# Patient Record
Sex: MALE | Race: OTHER | HISPANIC OR LATINO
[De-identification: names, ages, dates, MRNs, and addresses within clinical notes are randomized per-mention and may not be internally consistent; named-entity substitution may affect disease eponyms.]

---

## 2017-02-10 ENCOUNTER — APPOINTMENT (OUTPATIENT)
Dept: FAMILY MEDICINE | Facility: HOSPITAL | Age: 42
End: 2017-02-10

## 2017-02-10 ENCOUNTER — OUTPATIENT (OUTPATIENT)
Dept: OUTPATIENT SERVICES | Facility: HOSPITAL | Age: 42
LOS: 1 days | End: 2017-02-10
Payer: MEDICAID

## 2017-02-10 VITALS
HEART RATE: 87 BPM | SYSTOLIC BLOOD PRESSURE: 129 MMHG | OXYGEN SATURATION: 100 % | TEMPERATURE: 98.5 F | DIASTOLIC BLOOD PRESSURE: 96 MMHG | BODY MASS INDEX: 28.53 KG/M2 | WEIGHT: 156 LBS | RESPIRATION RATE: 16 BRPM

## 2017-02-10 PROCEDURE — G0463: CPT

## 2017-05-08 ENCOUNTER — OUTPATIENT (OUTPATIENT)
Dept: OUTPATIENT SERVICES | Facility: HOSPITAL | Age: 42
LOS: 1 days | End: 2017-05-08
Payer: COMMERCIAL

## 2017-05-08 ENCOUNTER — APPOINTMENT (OUTPATIENT)
Dept: FAMILY MEDICINE | Facility: HOSPITAL | Age: 42
End: 2017-05-08

## 2017-05-08 VITALS — RESPIRATION RATE: 14 BRPM | OXYGEN SATURATION: 99 % | HEART RATE: 96 BPM

## 2017-05-08 VITALS
WEIGHT: 164 LBS | SYSTOLIC BLOOD PRESSURE: 146 MMHG | TEMPERATURE: 98 F | BODY MASS INDEX: 30 KG/M2 | DIASTOLIC BLOOD PRESSURE: 100 MMHG

## 2017-05-08 PROCEDURE — G0463: CPT

## 2018-01-20 ENCOUNTER — EMERGENCY (EMERGENCY)
Facility: HOSPITAL | Age: 43
LOS: 1 days | Discharge: HOME | End: 2018-01-20
Admitting: EMERGENCY MEDICINE
Payer: COMMERCIAL
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
## 2018-01-20 DIAGNOSIS — M54.5 LOW BACK PAIN: ICD-10-CM
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
## 2018-01-20 DIAGNOSIS — S39.012A STRAIN OF MUSCLE, FASCIA AND TENDON OF LOWER BACK, INITIAL ENCOUNTER: ICD-10-CM
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
## 2018-01-20 PROCEDURE — 72100 X-RAY EXAM L-S SPINE 2/3 VWS: CPT | Mod: 26

## 2018-01-20 PROCEDURE — 99284 EMERGENCY DEPT VISIT MOD MDM: CPT

## 2018-01-21 PROCEDURE — 99283 EMERGENCY DEPT VISIT LOW MDM: CPT | Mod: 25

## 2018-01-21 PROCEDURE — 72100 X-RAY EXAM L-S SPINE 2/3 VWS: CPT

## 2018-01-21 PROCEDURE — 96372 THER/PROPH/DIAG INJ SC/IM: CPT

## 2018-02-06 ENCOUNTER — OUTPATIENT (OUTPATIENT)
Dept: OUTPATIENT SERVICES | Facility: HOSPITAL | Age: 43
LOS: 1 days | End: 2018-02-06
Payer: COMMERCIAL

## 2018-02-06 ENCOUNTER — APPOINTMENT (OUTPATIENT)
Dept: FAMILY MEDICINE | Facility: HOSPITAL | Age: 43
End: 2018-02-06
Payer: COMMERCIAL

## 2018-02-06 VITALS
BODY MASS INDEX: 29.81 KG/M2 | RESPIRATION RATE: 14 BRPM | WEIGHT: 163 LBS | SYSTOLIC BLOOD PRESSURE: 134 MMHG | HEART RATE: 108 BPM | DIASTOLIC BLOOD PRESSURE: 94 MMHG | OXYGEN SATURATION: 96 % | TEMPERATURE: 97.4 F

## 2018-02-06 DIAGNOSIS — Z00.00 ENCOUNTER FOR GENERAL ADULT MEDICAL EXAMINATION WITHOUT ABNORMAL FINDINGS: ICD-10-CM

## 2018-02-06 PROCEDURE — 93005 ELECTROCARDIOGRAM TRACING: CPT

## 2018-02-06 PROCEDURE — G0463: CPT

## 2018-02-06 PROCEDURE — 93010 ELECTROCARDIOGRAM REPORT: CPT

## 2018-02-06 RX ORDER — NYSTATIN 100MM UNIT
POWDER (EA) MISCELLANEOUS
Qty: 1 | Refills: 2 | Status: DISCONTINUED | COMMUNITY
Start: 2017-05-08 | End: 2018-02-06

## 2018-02-06 RX ORDER — CLOTRIMAZOLE 10 MG/G
1 CREAM TOPICAL
Qty: 1 | Refills: 1 | Status: DISCONTINUED | COMMUNITY
Start: 2017-05-08 | End: 2018-02-06

## 2018-02-08 DIAGNOSIS — R51 HEADACHE: ICD-10-CM

## 2018-02-08 DIAGNOSIS — Z59.0 HOMELESSNESS: ICD-10-CM

## 2018-02-08 DIAGNOSIS — E66.3 OVERWEIGHT: ICD-10-CM

## 2018-02-08 DIAGNOSIS — I10 ESSENTIAL (PRIMARY) HYPERTENSION: ICD-10-CM

## 2018-02-08 SDOH — ECONOMIC STABILITY - HOUSING INSECURITY: HOMELESSNESS: Z59.0

## 2018-09-22 ENCOUNTER — OUTPATIENT (OUTPATIENT)
Dept: OUTPATIENT SERVICES | Facility: HOSPITAL | Age: 43
LOS: 1 days | End: 2018-09-22
Payer: COMMERCIAL

## 2018-09-22 ENCOUNTER — APPOINTMENT (OUTPATIENT)
Dept: FAMILY MEDICINE | Facility: HOSPITAL | Age: 43
End: 2018-09-22

## 2018-09-22 VITALS
OXYGEN SATURATION: 98 % | HEIGHT: 62 IN | DIASTOLIC BLOOD PRESSURE: 94 MMHG | BODY MASS INDEX: 27.6 KG/M2 | RESPIRATION RATE: 14 BRPM | WEIGHT: 150 LBS | SYSTOLIC BLOOD PRESSURE: 135 MMHG | TEMPERATURE: 97.5 F | HEART RATE: 85 BPM

## 2018-09-22 DIAGNOSIS — Z00.00 ENCOUNTER FOR GENERAL ADULT MEDICAL EXAMINATION WITHOUT ABNORMAL FINDINGS: ICD-10-CM

## 2018-09-22 PROCEDURE — 80053 COMPREHEN METABOLIC PANEL: CPT

## 2018-09-22 PROCEDURE — 87389 HIV-1 AG W/HIV-1&-2 AB AG IA: CPT

## 2018-09-22 PROCEDURE — G0463: CPT

## 2018-09-22 PROCEDURE — 83036 HEMOGLOBIN GLYCOSYLATED A1C: CPT

## 2018-09-22 PROCEDURE — 80061 LIPID PANEL: CPT

## 2018-09-22 NOTE — COUNSELING
[Weight management counseling provided] : Weight management [Activity counseling provided] : activity [Keep Food Diary] : Keep food diary [Low Salt Diet] : Low salt diet

## 2018-09-25 LAB
ALBUMIN SERPL ELPH-MCNC: 4.4 G/DL
ALP BLD-CCNC: 59 U/L
ALT SERPL-CCNC: 17 U/L
ANION GAP SERPL CALC-SCNC: 15 MMOL/L
AST SERPL-CCNC: 19 U/L
BASOPHILS # BLD AUTO: 0.02 K/UL
BASOPHILS NFR BLD AUTO: 0.4 %
BILIRUB SERPL-MCNC: 0.3 MG/DL
BUN SERPL-MCNC: 12 MG/DL
CALCIUM SERPL-MCNC: 9.2 MG/DL
CHLORIDE SERPL-SCNC: 103 MMOL/L
CHOLEST SERPL-MCNC: 140 MG/DL
CHOLEST/HDLC SERPL: 3.7 RATIO
CO2 SERPL-SCNC: 23 MMOL/L
CREAT SERPL-MCNC: 1.07 MG/DL
EOSINOPHIL # BLD AUTO: 0.07 K/UL
EOSINOPHIL NFR BLD AUTO: 1.3 %
ESTIMATED AVERAGE GLUCOSE: 103 MG/DL
GLUCOSE SERPL-MCNC: 90 MG/DL
HBA1C MFR BLD HPLC: 5.2 %
HCT VFR BLD CALC: 42.9 %
HDLC SERPL-MCNC: 38 MG/DL
HGB BLD-MCNC: 14.8 G/DL
HIV1+2 AB SPEC QL IA.RAPID: NONREACTIVE
IMM GRANULOCYTES NFR BLD AUTO: 0.4 %
LDLC SERPL CALC-MCNC: 89 MG/DL
LYMPHOCYTES # BLD AUTO: 0.96 K/UL
LYMPHOCYTES NFR BLD AUTO: 17.8 %
MAN DIFF?: NORMAL
MCHC RBC-ENTMCNC: 29.7 PG
MCHC RBC-ENTMCNC: 34.5 GM/DL
MCV RBC AUTO: 86 FL
MONOCYTES # BLD AUTO: 0.48 K/UL
MONOCYTES NFR BLD AUTO: 8.9 %
NEUTROPHILS # BLD AUTO: 3.85 K/UL
NEUTROPHILS NFR BLD AUTO: 71.2 %
PLATELET # BLD AUTO: 249 K/UL
POTASSIUM SERPL-SCNC: 4.6 MMOL/L
PROT SERPL-MCNC: 7.1 G/DL
RBC # BLD: 4.99 M/UL
RBC # FLD: 14.3 %
SODIUM SERPL-SCNC: 141 MMOL/L
TRIGL SERPL-MCNC: 66 MG/DL
WBC # FLD AUTO: 5.4 K/UL

## 2018-09-25 NOTE — ASSESSMENT
[FreeTextEntry1] : 43 year old here for CPE\par \par #CPE\par \par -flu shot given today\par -f/u cbc, cmp, A1C, lipid panel and HIV\par -f/u prn for acute issues or in a year for CPE\par \par

## 2018-09-25 NOTE — HISTORY OF PRESENT ILLNESS
[FreeTextEntry1] : CPE [de-identified] : 43 year old Male pt here today for CPE. No complaints. States he has not had blood work done in a while and would like labs done. Denies an ROS questions

## 2021-10-19 ENCOUNTER — EMERGENCY (EMERGENCY)
Facility: HOSPITAL | Age: 46
LOS: 1 days | Discharge: HOME | End: 2021-10-19
Attending: EMERGENCY MEDICINE | Admitting: EMERGENCY MEDICINE
Payer: SELF-PAY

## 2021-10-19 VITALS
OXYGEN SATURATION: 100 % | TEMPERATURE: 97 F | HEIGHT: 60 IN | HEART RATE: 120 BPM | WEIGHT: 160.06 LBS | RESPIRATION RATE: 16 BRPM | SYSTOLIC BLOOD PRESSURE: 149 MMHG | DIASTOLIC BLOOD PRESSURE: 96 MMHG

## 2021-10-19 DIAGNOSIS — S93.402A SPRAIN OF UNSPECIFIED LIGAMENT OF LEFT ANKLE, INITIAL ENCOUNTER: ICD-10-CM

## 2021-10-19 DIAGNOSIS — S80.212A ABRASION, LEFT KNEE, INITIAL ENCOUNTER: ICD-10-CM
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
## 2021-10-19 DIAGNOSIS — M25.572 PAIN IN LEFT ANKLE AND JOINTS OF LEFT FOOT: ICD-10-CM
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
## 2021-10-19 PROCEDURE — 73562 X-RAY EXAM OF KNEE 3: CPT

## 2021-10-19 PROCEDURE — 73630 X-RAY EXAM OF FOOT: CPT | Mod: 26,LT

## 2021-10-19 PROCEDURE — 73630 X-RAY EXAM OF FOOT: CPT

## 2021-10-19 PROCEDURE — 73610 X-RAY EXAM OF ANKLE: CPT | Mod: 26,LT

## 2021-10-19 PROCEDURE — 73610 X-RAY EXAM OF ANKLE: CPT

## 2021-10-19 PROCEDURE — 99284 EMERGENCY DEPT VISIT MOD MDM: CPT | Mod: 25

## 2021-10-19 PROCEDURE — 99284 EMERGENCY DEPT VISIT MOD MDM: CPT

## 2021-10-19 PROCEDURE — 73562 X-RAY EXAM OF KNEE 3: CPT | Mod: 26,LT

## 2021-10-19 RX ORDER — IBUPROFEN 200 MG
600 TABLET ORAL ONCE
Refills: 0 | Status: COMPLETED | OUTPATIENT
Start: 2021-10-19 | End: 2021-10-19

## 2021-10-19 RX ADMIN — Medication 600 MILLIGRAM(S): at 11:31

## 2021-10-19 RX ADMIN — Medication 600 MILLIGRAM(S): at 11:25

## 2021-10-19 NOTE — ED PROVIDER NOTE - PHYSICAL EXAMINATION
left ankle- medial tenderness, positive ROM, NVI, positive 2+ pedal pulse, less than 2 sec cap refill   spine- no bony tenderness, positive ROM

## 2021-10-19 NOTE — ED PROVIDER NOTE - CARE PROVIDERS DIRECT ADDRESSES
,[email or web address]

## 2021-10-19 NOTE — ED PROVIDER NOTE - NSFOLLOWUPINSTRUCTIONS_ED_ALL_ED_FT
Rest, ice, elevate   Take motrin 600mg every 6 hours as needed for pain   Return to the ED if any worsening or persistent symptoms.         Esguince de tobillo    LO QUE NECESITA SABER:    ¿Qué es lucas torcedura de tobillo?Un esguince o torcedura de tobillo ocurre cuando 1 o más ligamentos en la articulación del tobillo se estiran o desgarran. Los ligamentos son tejidos resistentes que conectan los huesos. Los ligamentos sostienen las articulaciones y mantienen los huesos en gallardo lugar.    ¿Cuáles son los signos y síntomas de lucas torcedura de tobillo?  •Dificultad para  el tobillo o el pie      •Dolor al tocar o apoyar gallardo tobillo      •Moretones, inflamación o deformación del tobillo      ¿Cómo se diagnostica lucas torcedura de tobillo?Gallardo médico le preguntará acerca de gallardo lesión y lo examinará. Infórmele si usted escuchó un crujido o estallido cuando se lesionó. Gallardo médico revisará el movimiento y la fuerza de gallardo articulación. Es posible que le pida que usted mismo mueva la articulación. Informe a gallardo médico si alguna vez ha tenido lucas reacción alérgica al medio de contraste. Es posible que usted necesite alguno de los siguientes:   •Lucas radiografía de la articulaciónes lucas imagen de los huesos y tejidos en arabella articulaciones. Es posible que se le administre líquido de contraste en forma de inyección en gallardo articulación antes de caryn la radiografía. El medio de contraste ayudará a que la articulación se pueda apreciar con más claridad en la radiografía. Lucas radiografía de la articulación con medio de contraste se le llama artografía.      •Lucas resonancia magnética (RM)puede mostrar el esguince. Es posible que le administren un líquido de contraste para que las imágenes se aprecien mejor. No entre a la joey donde se realiza la resonancia magnética con algo de metal. El metal puede causar lesiones serias. Informe a gallardo médico si tiene algún metal dentro o sobre gallardo cuerpo.      ¿Cómo se trata lucas torcedura de tobillo?  •Los dispositivos de soportecomo lucas tobillera, un yeso o lucas férula podrían necesitarse para inmovilizar y proteger la articulación. Es posible que necesite usar muletas para disminuir el dolor cuando se desplace de un lado a otro.      •Medicamentos:?Los KENDELL,sweta el ibuprofeno, ayudan a disminuir la inflamación, el dolor y la fiebre. Ayde medicamento está disponible con o sin lucas receta médica. Los KENDELL pueden causar sangrado estomacal o problemas renales en ciertas personas. Si usted blayne un medicamento anticoagulante, siempre pregúntele a gallardo médico si los KENDELL son seguros para usted. Siempre raya la etiqueta de ayde medicamento y siga las instrucciones.      ?Acetaminofénalivia el dolor y baja la fiebre. Está disponible sin receta médica. Pregunte la cantidad y la frecuencia con que debe tomarlos. Siga las indicaciones. Raya las etiquetas de todos los demás medicamentos que esté usando para saber si también contienen acetaminofén, o pregunte a gallardo médico o farmacéutico. El acetaminofén puede causar daño en el hígado cuando no se blayne de forma correcta. No use más de 4 gramos (4000 miligramos) en total de acetaminofeno en un día.      ?Puede administrarsepodrían administrarse. Pregunte al médico cómo debe caryn ayde medicamento de forma ambriz. Algunos medicamentos recetados para el dolor contienen acetaminofén. No tome otros medicamentos que contengan acetaminofén sin consultarlo con gallardo médico. Demasiado acetaminofeno puede causar daño al hígado. Los medicamentos recetados para el dolor podrían causar estreñimiento. Pregunte a gallardo médico sweta prevenir o tratar estreñimiento.      •La fisioterapiapodría recomendarse. Un fisioterapeuta le puede enseñar ejercicios para ayudarle a mejorar el movimiento y la fuerza, y para disminuir el dolor.      •La cirugíapodría ser necesaria para reparar o reemplazar un ligamento desgarrado si la torcedura no se recupera con otros tratamientos. Gallardo médico podría usar tornillos para sujetar los huesos en el tobillo. Es posible que los tornillos puedan ayudar a apoyarlo y a estabilizarlo. Consulte con gallardo médico para más información sobre cirugía para tratar gallardo torcedura de tobillo.      ¿Cómo puedo manejar la torcedura de tobillo?  •Descansesu tobillo para que pueda sanar. Regrese a arabella actividades cotidianas según las indicaciones.      •Aplique hielosobre el tobillo de 15 a 20 minutos cada hora o sweta se le indique. Use lucas compresa de hielo o ponga hielo triturado en lucas bolsa de plástico. Cúbrala con lucas toalla. El hielo ayuda a evitar daño al tejido y a disminuir la inflamación y el dolor.      •Compresiónsu tobillo. Pregunte si debería envolver un vendaje elástico alrededor del ligamento lesionado. La venda elástica le proporciona soporte y ayuda a disminuir la inflamación y el movimiento para que la articulación se recupere. Úselo por el tiempo indicado.  Cómo colocar lucas venda elástica           •Elevesu tobillo por encima del nivel de gallardo corazón con la mayor frecuencia posible. [place] va a disminuir inflamación y el dolor. Apoye el tobillo sobre almohadas o cobijas para mantenerlo elevado cómodamente.   Elevar la pierna           ¿Cómo puedo evitar otra lesión?  •Permita que el tobillo se recupere.Infórmese del tiempo que el ligamento necesita para sanar. No mirna ninguna actividad física hasta que el médico lo autorice. Si usted empieza alguna actividad muy pronto, podría desarrollar lucas lesión más grave.      •Siempre mirna ejercicios de calentamiento y estiramientoantes de hacer ejercicio o practicar un deporte.      •Use equipo adecuado.Siempre use el calzado que le quede carley y esté hecho para la actividad que está practicando. También podría necesitar apoyo para los tobillos, almohadillas para los codos y rodillas o abrazaderas.      ¿Cuándo randal buscar atención inmediata?  •Usted tiene dolor intenso en el tobillo.      •Gallardo pie o dedos del pie están fríos o entumecidos.      •Gallardo tobillo se pone más débil o inestable (tambaleante).      •Usted no puede apoyarse sobre el tobillo o pie.      •Gallardo inflamación ha aumentado o regresado.      ¿Cuándo randal llamar a mi médico?  •El dolor no desaparece, incluso después del tratamiento.      •Usted tiene preguntas o inquietudes acerca de gallardo condición o cuidado.      ACUERDOS SOBRE GALLARDO CUIDADO:    Usted tiene el derecho de ayudar a planear gallardo cuidado. Aprenda todo lo que pueda sobre gallardo condición y sweta darle tratamiento. Discuta arabella opciones de tratamiento con arabella médicos para decidir el cuidado que usted desea recibir. Usted siempre tiene el derecho de rechazar el tratamiento.               Abrasión    LO QUE NECESITA SABER:    ¿Qué es lucas abrasión?Lucas abrasión es lucas herida en la piel. Las abrasiones suelen ocurrir cuando la piel roza contra lucas superficie áspera. Ejemplos de lucas abrasión incluyen lucas quemadura con la alfombra, un codo despellejado o lucas rozadura producida por lucas caída. Las abrasiones pueden ser profundas o superficiales. La herida puede doler, sangrar, presentar hematomas o hincharse.    ¿Cómo puedo cuidar mi abrasión?  •Lávese las rolando y séquelas con lucas toalla limpia arlen.      •Presione un paño limpio contra la herida por 5 a 10 minutos para detener el sangrado.      •Enjuague la herida con agua limpia. No use un jabón muy mary, alcohol o soluciones de yodo.      •Use un paño limpio y mojado para quitar cualquier objeto, sweta pedazos pequeños de piedras o polvo.      •Frote ungüento de antibiótico en gallardo herida. [place] podría ayudar a evitar lucas infección y ayudar a que gallardo herida sane.      •Cubra la herida con un vendaje antiadherente. Cambie el vendaje diariamente, y también si se moja o se ensucia.      ¿Cuándo randal buscar atención inmediata?  •El sangrado no se detiene después de 10 minutos de presionar firmemente.      •El enrojecimiento alrededor de la herida comienza a extenderse.      •Usted no puede enjuagar alyx o más objetos extraños fuera de gallardo herida.      ¿Cuándo randal llamar a mi médico?  •Usted tiene fiebre o escalofríos.      •Gallardo abrasión está enrojecida, cálida, inflamada o drenando pus.      •Usted tiene preguntas o inquietudes acerca de gallardo condición o cuidado.      ACUERDOS SOBRE GALLARDO CUIDADO:    Usted tiene el derecho de ayudar a planear gallardo cuidado. Aprenda todo lo que pueda sobre gallardo condición y sweta darle tratamiento. Discuta arabella opciones de tratamiento con arabella médicos para decidir el cuidado que usted desea recibir. Usted siempre tiene el derecho de rechazar el tratamiento.

## 2021-10-19 NOTE — ED PROVIDER NOTE - CARE PLAN
Principal Discharge DX:	Left ankle sprain  Secondary Diagnosis:	Fall  Secondary Diagnosis:	Abrasion of knee, left   1

## 2021-10-19 NOTE — ED PROVIDER NOTE - CARE PROVIDER_API CALL
Mauricio Dalal)  Orthopaedic Sports Medicine; Orthopaedic Surgery  [address] [address]  Phone: [phone]  Fax: [phone]  Follow Up Time:

## 2021-10-19 NOTE — ED PROCEDURE NOTE - NS ED PERI NEURO NEG
Pre-application: Motor, sensory, and vascular responses intact in the injured extremity.
Pre-application: Motor, sensory, and vascular responses intact in the injured extremity./Post-application: Motor, sensory, and vascular responses intact in the injured extremity./The patient/caregiver verbalized understanding of how to care for the injured extremity with splint

## 2021-10-19 NOTE — ED ADULT NURSE NOTE - OBJECTIVE STATEMENT
46 yr old male c/o left ankle pain. Pt states he tripped and fell yesterday. Pt denies LOC, + swelling to left ankle, + sensation, capillary refill < 3 sec , skin warm/dry.

## 2021-10-19 NOTE — ED PROVIDER NOTE - PATIENT PORTAL LINK FT
You can access the FollowMyHealth Patient Portal offered by [facility] by registering at the following website: [email or web address]. By joining [employer]’s FollowMyHealth portal, you will also be able to view your health information using other applications (apps) compatible with our system.

## 2021-10-19 NOTE — ED PROCEDURE NOTE - ATTENDING CONTRIBUTION TO CARE
Reports irregular contractions.   
Reports irregular contractions.   Reports mood stable.   
Dr. Amin: I performed a face to face bedside interview with patient regarding history of present illness, review of symptoms and past medical history. I completed an independent physical exam.  I have discussed patient's plan of care with PA.   I agree with note as stated above, having amended the EMR as needed to reflect my findings.   This includes HISTORY OF PRESENT ILLNESS, HIV, PAST MEDICAL/SURGICAL/FAMILY/SOCIAL HISTORY, ALLERGIES AND HOME MEDICATIONS, REVIEW OF SYSTEMS, PHYSICAL EXAM, and any PROGRESS NOTES during the time I functioned as the attending physician for this patient.

## 2021-10-19 NOTE — ED PROVIDER NOTE - CLINICAL SUMMARY MEDICAL DECISION MAKING FREE TEXT BOX
Dr. Amin: Pt declined , speaking in English. States he tripped and fell yesterday after banging left knee against metal, fell, twisted left ankle. Did not hit head. No other injuries. Able to ambulate. No other med hx, not on blood thinners. On exam pt is well appearing, nad, no signs of head trauma, no C spine ttp, RRR, CTAB, abdo soft/nt/nd, +minimal ttp diffuse left knee and left medial malleolus, +DP, cap refill < 2 sec. Xray, pain ctrl, air cast, dc.

## 2021-10-19 NOTE — ED PROVIDER NOTE - ATTENDING CONTRIBUTION TO CARE
Dr. Amin: I performed a face to face bedside interview with patient regarding history of present illness, review of symptoms and past medical history. I completed an independent physical exam.  I have discussed patient's plan of care with PA.   I agree with note as stated above, having amended the EMR as needed to reflect my findings.   This includes HISTORY OF PRESENT ILLNESS, HIV, PAST MEDICAL/SURGICAL/FAMILY/SOCIAL HISTORY, ALLERGIES AND HOME MEDICATIONS, REVIEW OF SYSTEMS, PHYSICAL EXAM, and any PROGRESS NOTES during the time I functioned as the attending physician for this patient.    see mdm

## 2021-10-19 NOTE — ED PROVIDER NOTE - OBJECTIVE STATEMENT
45yo male states tripped yesterday, striking his left knee against something metal and twisting his ankle, then falling. Has a small abrasion just below the knee with no active bleeding and endorses pain to the medial ankle. Denies other injuries. 47yo male with no pmhx states tripped yesterday, striking his left knee against something metal and twisting his ankle, then falling. Has a small abrasion just below the knee with no active bleeding and endorses pain to the medial ankle. Denies other injuries. Denies hitting head or LOC. No blood thinners. last tetanus unknown. Pt ambulating in ED.

## 2021-10-19 NOTE — ED PROCEDURE NOTE - CPROC ED POST PROC CARE GUIDE1
Verbal/written post procedure instructions were given to patient/caregiver./Elevate the injured extremity as instructed.
Instructed patient/caregiver to follow-up with primary care physician./Instructed patient/caregiver regarding signs and symptoms of infection./Elevate the injured extremity as instructed.

## 2022-04-26 ENCOUNTER — APPOINTMENT (OUTPATIENT)
Dept: FAMILY MEDICINE | Facility: HOSPITAL | Age: 47
End: 2022-04-26

## 2022-04-26 ENCOUNTER — OUTPATIENT (OUTPATIENT)
Dept: OUTPATIENT SERVICES | Facility: HOSPITAL | Age: 47
LOS: 1 days | End: 2022-04-26
Payer: COMMERCIAL

## 2022-04-26 VITALS
WEIGHT: 155 LBS | HEART RATE: 82 BPM | TEMPERATURE: 97.5 F | OXYGEN SATURATION: 99 % | BODY MASS INDEX: 28.35 KG/M2 | SYSTOLIC BLOOD PRESSURE: 145 MMHG | RESPIRATION RATE: 14 BRPM | DIASTOLIC BLOOD PRESSURE: 90 MMHG

## 2022-04-26 DIAGNOSIS — F10.10 ALCOHOL ABUSE, UNCOMPLICATED: ICD-10-CM

## 2022-04-26 DIAGNOSIS — Z80.9 FAMILY HISTORY OF MALIGNANT NEOPLASM, UNSPECIFIED: ICD-10-CM

## 2022-04-26 DIAGNOSIS — Z81.1 FAMILY HISTORY OF ALCOHOL ABUSE AND DEPENDENCE: ICD-10-CM

## 2022-04-26 DIAGNOSIS — Z00.00 ENCOUNTER FOR GENERAL ADULT MEDICAL EXAMINATION WITHOUT ABNORMAL FINDINGS: ICD-10-CM

## 2022-04-26 PROBLEM — Z78.9 OTHER SPECIFIED HEALTH STATUS: Chronic | Status: ACTIVE | Noted: 2021-10-19

## 2022-04-26 PROCEDURE — 80061 LIPID PANEL: CPT

## 2022-04-26 PROCEDURE — 83036 HEMOGLOBIN GLYCOSYLATED A1C: CPT

## 2022-04-26 PROCEDURE — 87389 HIV-1 AG W/HIV-1&-2 AB AG IA: CPT

## 2022-04-26 PROCEDURE — 80053 COMPREHEN METABOLIC PANEL: CPT

## 2022-04-26 PROCEDURE — 85025 COMPLETE CBC W/AUTO DIFF WBC: CPT

## 2022-04-26 PROCEDURE — G0463: CPT

## 2022-04-26 PROCEDURE — 80307 DRUG TEST PRSMV CHEM ANLYZR: CPT

## 2022-04-26 PROCEDURE — 86803 HEPATITIS C AB TEST: CPT

## 2022-04-26 RX ORDER — HYDROCHLOROTHIAZIDE 12.5 MG/1
12.5 CAPSULE ORAL DAILY
Qty: 30 | Refills: 0 | Status: DISCONTINUED | COMMUNITY
Start: 2018-02-06 | End: 2022-04-26

## 2022-04-27 LAB
ALBUMIN SERPL ELPH-MCNC: 4.9 G/DL
ALP BLD-CCNC: 81 U/L
ALT SERPL-CCNC: 38 U/L
ANION GAP SERPL CALC-SCNC: 12 MMOL/L
AST SERPL-CCNC: 28 U/L
BASOPHILS # BLD AUTO: 0.04 K/UL
BASOPHILS NFR BLD AUTO: 0.7 %
BILIRUB SERPL-MCNC: 0.2 MG/DL
BUN SERPL-MCNC: 14 MG/DL
CALCIUM SERPL-MCNC: 9.6 MG/DL
CHLORIDE SERPL-SCNC: 104 MMOL/L
CHOLEST SERPL-MCNC: 179 MG/DL
CO2 SERPL-SCNC: 24 MMOL/L
CREAT SERPL-MCNC: 1.57 MG/DL
EGFR: 55 ML/MIN/1.73M2
EOSINOPHIL # BLD AUTO: 0.07 K/UL
EOSINOPHIL NFR BLD AUTO: 1.2 %
ESTIMATED AVERAGE GLUCOSE: 108 MG/DL
ETHANOL BLD-MCNC: <10 MG/DL
GLUCOSE SERPL-MCNC: 113 MG/DL
HBA1C MFR BLD HPLC: 5.4 %
HCT VFR BLD CALC: 45.3 %
HCV AB SER QL: NONREACTIVE
HCV S/CO RATIO: 0.28 S/CO
HDLC SERPL-MCNC: 46 MG/DL
HGB BLD-MCNC: 14.4 G/DL
HIV1+2 AB SPEC QL IA.RAPID: NONREACTIVE
IMM GRANULOCYTES NFR BLD AUTO: 0.5 %
LDLC SERPL CALC-MCNC: 111 MG/DL
LYMPHOCYTES # BLD AUTO: 0.9 K/UL
LYMPHOCYTES NFR BLD AUTO: 15.6 %
MAN DIFF?: NORMAL
MCHC RBC-ENTMCNC: 27.7 PG
MCHC RBC-ENTMCNC: 31.8 GM/DL
MCV RBC AUTO: 87.3 FL
MONOCYTES # BLD AUTO: 0.34 K/UL
MONOCYTES NFR BLD AUTO: 5.9 %
NEUTROPHILS # BLD AUTO: 4.4 K/UL
NEUTROPHILS NFR BLD AUTO: 76.1 %
NONHDLC SERPL-MCNC: 133 MG/DL
PLATELET # BLD AUTO: 312 K/UL
POTASSIUM SERPL-SCNC: 4.4 MMOL/L
PROT SERPL-MCNC: 7.5 G/DL
RBC # BLD: 5.19 M/UL
RBC # FLD: 14.1 %
SODIUM SERPL-SCNC: 140 MMOL/L
TRIGL SERPL-MCNC: 108 MG/DL
WBC # FLD AUTO: 5.78 K/UL

## 2022-04-27 NOTE — COUNSELING
[Hazards of at-risk alcohol use discussed] : Hazards of at-risk alcohol use discussed [FreeTextEntry2] : Patient denies alcohol abuse, however, has hx of alcohol abuse and homelessness. Smells of alcohol in today's visit.

## 2022-04-27 NOTE — PHYSICAL EXAM
[No Acute Distress] : no acute distress [Normal Sclera/Conjunctiva] : normal sclera/conjunctiva [PERRL] : pupils equal round and reactive to light [EOMI] : extraocular movements intact [Normal Outer Ear/Nose] : the outer ears and nose were normal in appearance [Normal Oropharynx] : the oropharynx was normal [No JVD] : no jugular venous distention [No Lymphadenopathy] : no lymphadenopathy [Supple] : supple [Thyroid Normal, No Nodules] : the thyroid was normal and there were no nodules present [No Respiratory Distress] : no respiratory distress  [No Accessory Muscle Use] : no accessory muscle use [Clear to Auscultation] : lungs were clear to auscultation bilaterally [Normal Rate] : normal rate  [Regular Rhythm] : with a regular rhythm [Normal S1, S2] : normal S1 and S2 [No Murmur] : no murmur heard [No Carotid Bruits] : no carotid bruits [No Abdominal Bruit] : a ~M bruit was not heard ~T in the abdomen [No Varicosities] : no varicosities [Pedal Pulses Present] : the pedal pulses are present [No Edema] : there was no peripheral edema [No Palpable Aorta] : no palpable aorta [No Extremity Clubbing/Cyanosis] : no extremity clubbing/cyanosis [Soft] : abdomen soft [Non Tender] : non-tender [Non-distended] : non-distended [No Masses] : no abdominal mass palpated [No HSM] : no HSM [Normal Bowel Sounds] : normal bowel sounds [Normal Posterior Cervical Nodes] : no posterior cervical lymphadenopathy [Normal Anterior Cervical Nodes] : no anterior cervical lymphadenopathy [No CVA Tenderness] : no CVA  tenderness [No Spinal Tenderness] : no spinal tenderness [No Joint Swelling] : no joint swelling [Grossly Normal Strength/Tone] : grossly normal strength/tone [No Rash] : no rash [Coordination Grossly Intact] : coordination grossly intact [No Focal Deficits] : no focal deficits [Normal Gait] : normal gait [Deep Tendon Reflexes (DTR)] : deep tendon reflexes were 2+ and symmetric [Normal Affect] : the affect was normal [de-identified] : Unkempt, poor hygiene [de-identified] : Tangential speech

## 2022-04-27 NOTE — HISTORY OF PRESENT ILLNESS
[FreeTextEntry1] : Walks in to a Physical [de-identified] : 45 yo M who has not returned to clinic for 4 years presents as a walk-in for a physical. \par -No acute complaints in today's visit. To note, pt is disheveled, tangential speech and smells of alcohol. When inquired about alcohol use, pt states he does not recall when his last drink was, states was months ago.

## 2022-04-27 NOTE — HEALTH RISK ASSESSMENT
[Good] : ~his/her~  mood as  good [Intercurrent ED visits] : went to ED [Current] : Current [Yes] : Yes [No] : In the past 12 months have you used drugs other than those required for medical reasons? No [No falls in past year] : Patient reported no falls in the past year [0] : 2) Feeling down, depressed, or hopeless: Not at all (0) [PHQ-2 Negative - No further assessment needed] : PHQ-2 Negative - No further assessment needed [HIV Test offered] : HIV Test offered [Hepatitis C test offered] : Hepatitis C test offered [Alone] : lives alone [Single] : single [Feels Safe at Home] : Feels safe at home [Fully functional (bathing, dressing, toileting, transferring, walking, feeding)] : Fully functional (bathing, dressing, toileting, transferring, walking, feeding) [Fully functional (using the telephone, shopping, preparing meals, housekeeping, doing laundry, using] : Fully functional and needs no help or supervision to perform IADLs (using the telephone, shopping, preparing meals, housekeeping, doing laundry, using transportation, managing medications and managing finances) [Smoke Detector] : smoke detector [Carbon Monoxide Detector] : carbon monoxide detector [Safety elements used in home] : safety elements used in home [Seat Belt] :  uses seat belt [Sunscreen] : uses sunscreen [de-identified] : Smokes cigars, 1 cigar a day, smoked for 18 years [de-identified] : Pt poor historian, denies alcohol use, however, pt unkempt w smell of alcohol [de-identified] : Likes to do weights, states he was in the  services in [facility] [de-identified] : Likes vegetables, eggs, chicken [Change in mental status noted] : No change in mental status noted [Language] : denies difficulty with language [Sexually Active] : not sexually active [Reports changes in hearing] : Reports no changes in hearing [Reports changes in vision] : Reports no changes in vision [Reports changes in dental health] : Reports no changes in dental health [Guns at Home] : no guns at home [Travel to Developing Areas] : does not  travel to developing areas [TB Exposure] : is not being exposed to tuberculosis [Caregiver Concerns] : does not have caregiver concerns [de-identified] : Rents a room in a house [FreeTextEntry2] : 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

## 2022-04-29 DIAGNOSIS — I10 ESSENTIAL (PRIMARY) HYPERTENSION: ICD-10-CM

## 2022-04-29 DIAGNOSIS — F10.10 ALCOHOL ABUSE, UNCOMPLICATED: ICD-10-CM

## 2022-05-11 ENCOUNTER — APPOINTMENT (OUTPATIENT)
Dept: FAMILY MEDICINE | Facility: HOSPITAL | Age: 47
End: 2022-05-11

## 2022-09-07 ENCOUNTER — OUTPATIENT (OUTPATIENT)
Dept: OUTPATIENT SERVICES | Facility: HOSPITAL | Age: 47
LOS: 1 days | End: 2022-09-07
Payer: COMMERCIAL

## 2022-09-07 ENCOUNTER — APPOINTMENT (OUTPATIENT)
Dept: FAMILY MEDICINE | Facility: HOSPITAL | Age: 47
End: 2022-09-07

## 2022-09-07 VITALS — DIASTOLIC BLOOD PRESSURE: 93 MMHG | SYSTOLIC BLOOD PRESSURE: 135 MMHG

## 2022-09-07 DIAGNOSIS — Z59.0 HOMELESSNESS: ICD-10-CM

## 2022-09-07 DIAGNOSIS — E66.3 OVERWEIGHT: ICD-10-CM

## 2022-09-07 DIAGNOSIS — Z86.79 PERSONAL HISTORY OF OTHER DISEASES OF THE CIRCULATORY SYSTEM: ICD-10-CM

## 2022-09-07 DIAGNOSIS — Z00.00 ENCOUNTER FOR GENERAL ADULT MEDICAL EXAMINATION WITHOUT ABNORMAL FINDINGS: ICD-10-CM

## 2022-09-07 DIAGNOSIS — Z87.898 PERSONAL HISTORY OF OTHER SPECIFIED CONDITIONS: ICD-10-CM

## 2022-09-07 DIAGNOSIS — Z86.018 PERSONAL HISTORY OF OTHER BENIGN NEOPLASM: ICD-10-CM

## 2022-09-07 DIAGNOSIS — B35.3 TINEA PEDIS: ICD-10-CM

## 2022-09-07 DIAGNOSIS — R46.0 VERY LOW LVL OF PERSONAL HYGIENE: ICD-10-CM

## 2022-09-07 PROCEDURE — G0463: CPT

## 2022-09-07 SDOH — ECONOMIC STABILITY - HOUSING INSECURITY: HOMELESSNESS: Z59.0

## 2022-09-09 DIAGNOSIS — Z12.11 ENCOUNTER FOR SCREENING FOR MALIGNANT NEOPLASM OF COLON: ICD-10-CM

## 2022-09-09 DIAGNOSIS — R03.0 ELEVATED BLOOD-PRESSURE READING, WITHOUT DIAGNOSIS OF HYPERTENSION: ICD-10-CM

## 2022-09-09 DIAGNOSIS — Z23 ENCOUNTER FOR IMMUNIZATION: ICD-10-CM

## 2022-09-09 DIAGNOSIS — Z72.89 OTHER PROBLEMS RELATED TO LIFESTYLE: ICD-10-CM

## 2022-09-11 NOTE — HISTORY OF PRESENT ILLNESS
[Patient was last seen on ___] : Patient was last seen on [unfilled] [Other: ___] : [unfilled] [FreeTextEntry6] : Blood pressure today 141/97, 126/87 on repeat. Pt does not check blood pressure at home because he cant afford the machine and when he tried to have it checked at [facility], the machines were broken. He denies chest pain, headaches, SOB, or vision changes.\par \par The patient also had an elevated creatinine of 1.57 on his last blood work in April. He denies any drug use, or OTC medications. He admits to drinking Vino Cordial regularly to make him "strong for work". This is an alcoholic drink.   \par

## 2022-09-11 NOTE — INTERPRETER SERVICES
[[employer] ] : provided by [employer]   [Interpreters_IDNumber] : [id number] [Interpreters_FullName] : Sanjana

## 2022-09-21 ENCOUNTER — NON-APPOINTMENT (OUTPATIENT)
Age: 47
End: 2022-09-21

## 2022-09-21 ENCOUNTER — MED ADMIN CHARGE (OUTPATIENT)
Age: 47
End: 2022-09-21

## 2022-09-21 ENCOUNTER — APPOINTMENT (OUTPATIENT)
Dept: FAMILY MEDICINE | Facility: HOSPITAL | Age: 47
End: 2022-09-21

## 2022-09-21 ENCOUNTER — OUTPATIENT (OUTPATIENT)
Dept: OUTPATIENT SERVICES | Facility: HOSPITAL | Age: 47
LOS: 1 days | End: 2022-09-21
Payer: COMMERCIAL

## 2022-09-21 VITALS
DIASTOLIC BLOOD PRESSURE: 88 MMHG | SYSTOLIC BLOOD PRESSURE: 125 MMHG | RESPIRATION RATE: 16 BRPM | TEMPERATURE: 98 F | OXYGEN SATURATION: 98 % | HEART RATE: 82 BPM | HEIGHT: 61 IN

## 2022-09-21 VITALS — DIASTOLIC BLOOD PRESSURE: 87 MMHG | SYSTOLIC BLOOD PRESSURE: 118 MMHG

## 2022-09-21 DIAGNOSIS — E63.9 NUTRITIONAL DEFICIENCY, UNSPECIFIED: ICD-10-CM

## 2022-09-21 DIAGNOSIS — Z86.19 PERSONAL HISTORY OF OTHER INFECTIOUS AND PARASITIC DISEASES: ICD-10-CM

## 2022-09-21 DIAGNOSIS — H92.09 OTALGIA, UNSPECIFIED EAR: ICD-10-CM

## 2022-09-21 DIAGNOSIS — Z87.898 PERSONAL HISTORY OF OTHER SPECIFIED CONDITIONS: ICD-10-CM

## 2022-09-21 DIAGNOSIS — F10.21 ALCOHOL DEPENDENCE, IN REMISSION: ICD-10-CM

## 2022-09-21 DIAGNOSIS — Z00.00 ENCOUNTER FOR GENERAL ADULT MEDICAL EXAMINATION WITHOUT ABNORMAL FINDINGS: ICD-10-CM

## 2022-09-21 DIAGNOSIS — Z23 ENCOUNTER FOR IMMUNIZATION: ICD-10-CM

## 2022-09-21 DIAGNOSIS — Z87.2 PERSONAL HISTORY OF DISEASES OF THE SKIN AND SUBCUTANEOUS TISSUE: ICD-10-CM

## 2022-09-21 DIAGNOSIS — Z87.19 PERSONAL HISTORY OF OTHER DISEASES OF THE DIGESTIVE SYSTEM: ICD-10-CM

## 2022-09-21 DIAGNOSIS — M79.673 PAIN IN UNSPECIFIED FOOT: ICD-10-CM

## 2022-09-21 DIAGNOSIS — B35.2 TINEA MANUUM: ICD-10-CM

## 2022-09-21 DIAGNOSIS — B49 UNSPECIFIED MYCOSIS: ICD-10-CM

## 2022-09-21 DIAGNOSIS — Z12.11 ENCOUNTER FOR SCREENING FOR MALIGNANT NEOPLASM OF COLON: ICD-10-CM

## 2022-09-21 PROCEDURE — 80053 COMPREHEN METABOLIC PANEL: CPT

## 2022-09-21 PROCEDURE — 82274 ASSAY TEST FOR BLOOD FECAL: CPT

## 2022-09-21 PROCEDURE — G0463: CPT

## 2022-09-21 NOTE — PHYSICAL EXAM
[No Acute Distress] : no acute distress [Well Nourished] : well nourished [Well Developed] : well developed [No Respiratory Distress] : no respiratory distress  [Clear to Auscultation] : lungs were clear to auscultation bilaterally [Normal Rate] : normal rate  [Regular Rhythm] : with a regular rhythm [Normal S1, S2] : normal S1 and S2 [Soft] : abdomen soft [Non Tender] : non-tender [Non-distended] : non-distended [Coordination Grossly Intact] : coordination grossly intact [Normal Affect] : the affect was normal [Alert and Oriented x3] : oriented to person, place, and time

## 2022-09-22 DIAGNOSIS — Z23 ENCOUNTER FOR IMMUNIZATION: ICD-10-CM

## 2022-09-22 DIAGNOSIS — I10 ESSENTIAL (PRIMARY) HYPERTENSION: ICD-10-CM

## 2022-09-22 DIAGNOSIS — Z72.89 OTHER PROBLEMS RELATED TO LIFESTYLE: ICD-10-CM

## 2022-09-22 DIAGNOSIS — Z12.11 ENCOUNTER FOR SCREENING FOR MALIGNANT NEOPLASM OF COLON: ICD-10-CM

## 2022-09-25 NOTE — INTERPRETER SERVICES
[[employer] ] : provided by [employer]   [Interpreters_IDNumber] : [id number] [Interpreters_FullName] : jennifer Campbell

## 2022-09-25 NOTE — REVIEW OF SYSTEMS
[Fever] : no fever [Chills] : no chills [Fatigue] : no fatigue [Chest Pain] : no chest pain [Palpitations] : no palpitations [Shortness Of Breath] : no shortness of breath [Cough] : no cough [Abdominal Pain] : no abdominal pain [Nausea] : no nausea [Headache] : no headache [Dizziness] : no dizziness

## 2022-09-25 NOTE — HISTORY OF PRESENT ILLNESS
[Hypertension] : Hypertension [Patient was last seen on ___] : Patient was last seen on [unfilled] [<130/90] : Target blood pressure is  <130/90 [Target goal met] : BP target goal met [FreeTextEntry6] : Pt is a 46 y/o male who is here for a BP check. Today BP is 125/88. He has not been checking his BP at [facility] because the machine still wasn’t working. [facility] tried a different [facility] but they did not have a BP machine there. He has stopped drinking his alcoholic drink.

## 2022-09-28 LAB
ALBUMIN SERPL ELPH-MCNC: 4.8 G/DL
ALP BLD-CCNC: 62 U/L
ALT SERPL-CCNC: 17 U/L
ANION GAP SERPL CALC-SCNC: 11 MMOL/L
AST SERPL-CCNC: 19 U/L
BILIRUB SERPL-MCNC: 0.5 MG/DL
BUN SERPL-MCNC: 18 MG/DL
CALCIUM SERPL-MCNC: 9.8 MG/DL
CHLORIDE SERPL-SCNC: 107 MMOL/L
CO2 SERPL-SCNC: 25 MMOL/L
CREAT SERPL-MCNC: 0.99 MG/DL
EGFR: 96 ML/MIN/1.73M2
GLUCOSE SERPL-MCNC: 97 MG/DL
HEMOCCULT STL QL IA: NEGATIVE
POTASSIUM SERPL-SCNC: 4.7 MMOL/L
PROT SERPL-MCNC: 7.3 G/DL
SODIUM SERPL-SCNC: 144 MMOL/L

## 2022-11-30 ENCOUNTER — APPOINTMENT (OUTPATIENT)
Dept: FAMILY MEDICINE | Facility: HOSPITAL | Age: 47
End: 2022-11-30

## 2022-11-30 ENCOUNTER — OUTPATIENT (OUTPATIENT)
Dept: OUTPATIENT SERVICES | Facility: HOSPITAL | Age: 47
LOS: 1 days | End: 2022-11-30
Payer: COMMERCIAL

## 2022-11-30 VITALS
DIASTOLIC BLOOD PRESSURE: 98 MMHG | OXYGEN SATURATION: 96 % | RESPIRATION RATE: 16 BRPM | WEIGHT: 155 LBS | SYSTOLIC BLOOD PRESSURE: 136 MMHG | BODY MASS INDEX: 29.29 KG/M2 | HEART RATE: 88 BPM | TEMPERATURE: 97.9 F

## 2022-11-30 VITALS — SYSTOLIC BLOOD PRESSURE: 129 MMHG | DIASTOLIC BLOOD PRESSURE: 90 MMHG

## 2022-11-30 DIAGNOSIS — Z00.00 ENCOUNTER FOR GENERAL ADULT MEDICAL EXAMINATION WITHOUT ABNORMAL FINDINGS: ICD-10-CM

## 2022-11-30 PROCEDURE — G0463: CPT

## 2022-12-02 DIAGNOSIS — R03.0 ELEVATED BLOOD-PRESSURE READING, WITHOUT DIAGNOSIS OF HYPERTENSION: ICD-10-CM

## 2022-12-02 DIAGNOSIS — L40.9 PSORIASIS, UNSPECIFIED: ICD-10-CM

## 2022-12-07 ENCOUNTER — APPOINTMENT (OUTPATIENT)
Dept: FAMILY MEDICINE | Facility: HOSPITAL | Age: 47
End: 2022-12-07

## 2022-12-07 ENCOUNTER — OUTPATIENT (OUTPATIENT)
Dept: OUTPATIENT SERVICES | Facility: HOSPITAL | Age: 47
LOS: 1 days | End: 2022-12-07
Payer: COMMERCIAL

## 2022-12-07 VITALS
DIASTOLIC BLOOD PRESSURE: 82 MMHG | HEART RATE: 94 BPM | WEIGHT: 145 LBS | BODY MASS INDEX: 27.4 KG/M2 | OXYGEN SATURATION: 98 % | RESPIRATION RATE: 16 BRPM | TEMPERATURE: 98.6 F | SYSTOLIC BLOOD PRESSURE: 138 MMHG

## 2022-12-07 DIAGNOSIS — Z00.00 ENCOUNTER FOR GENERAL ADULT MEDICAL EXAMINATION WITHOUT ABNORMAL FINDINGS: ICD-10-CM

## 2022-12-07 DIAGNOSIS — I10 ESSENTIAL (PRIMARY) HYPERTENSION: ICD-10-CM

## 2022-12-07 DIAGNOSIS — L40.9 PSORIASIS, UNSPECIFIED: ICD-10-CM

## 2022-12-07 PROCEDURE — G0463: CPT

## 2022-12-14 ENCOUNTER — OUTPATIENT (OUTPATIENT)
Dept: OUTPATIENT SERVICES | Facility: HOSPITAL | Age: 47
LOS: 1 days | End: 2022-12-14
Payer: COMMERCIAL

## 2022-12-14 ENCOUNTER — APPOINTMENT (OUTPATIENT)
Dept: FAMILY MEDICINE | Facility: HOSPITAL | Age: 47
End: 2022-12-14

## 2022-12-14 VITALS
WEIGHT: 145 LBS | SYSTOLIC BLOOD PRESSURE: 128 MMHG | TEMPERATURE: 98.6 F | RESPIRATION RATE: 16 BRPM | BODY MASS INDEX: 27.38 KG/M2 | DIASTOLIC BLOOD PRESSURE: 82 MMHG | HEART RATE: 84 BPM | HEIGHT: 61 IN | OXYGEN SATURATION: 99 %

## 2022-12-14 DIAGNOSIS — Z00.00 ENCOUNTER FOR GENERAL ADULT MEDICAL EXAMINATION WITHOUT ABNORMAL FINDINGS: ICD-10-CM

## 2022-12-14 PROCEDURE — G0463: CPT

## 2022-12-17 DIAGNOSIS — L40.9 PSORIASIS, UNSPECIFIED: ICD-10-CM

## 2022-12-17 DIAGNOSIS — R20.2 PARESTHESIA OF SKIN: ICD-10-CM

## 2022-12-17 DIAGNOSIS — R03.0 ELEVATED BLOOD-PRESSURE READING, WITHOUT DIAGNOSIS OF HYPERTENSION: ICD-10-CM

## 2022-12-19 NOTE — REVIEW OF SYSTEMS
[Fever] : no fever [Chills] : no chills [Chest Pain] : no chest pain [Shortness Of Breath] : no shortness of breath [Wheezing] : no wheezing [Cough] : no cough [Abdominal Pain] : no abdominal pain [Vomiting] : no vomiting [de-identified] : no new rashes

## 2022-12-19 NOTE — PHYSICAL EXAM
[No Acute Distress] : no acute distress [Well Nourished] : well nourished [Well Developed] : well developed [Well-Appearing] : well-appearing [No Respiratory Distress] : no respiratory distress  [Clear to Auscultation] : lungs were clear to auscultation bilaterally [Normal Rate] : normal rate  [Regular Rhythm] : with a regular rhythm [Normal S1, S2] : normal S1 and S2 [Soft] : abdomen soft [Non Tender] : non-tender [de-identified] : lichenified patch on Right hypothenar eminence. Non tender. Dry skin on hands. light plaque extending over dorsum of Left hand middle digit. - mildly improving

## 2022-12-19 NOTE — HISTORY OF PRESENT ILLNESS
[FreeTextEntry1] : Psoriasis follow up [de-identified] : Pt is a 46 y/o M with no significant PMHx here for a follow up for his psoriasis. He was seen last week in clinic and prescribed a steroid cream, Aquaphor, and amlodipine 5 mg. He had just picked up the steroid cream and the amlodipine from the pharmacy yesterday. He reports that someone told him he is supposed to wash off the steroid cream after he applies it. So that is what he has been doing. He uses a coconut cream at home to moisturize his hands. He states that he gets a bit of burning when he applies the cream. \par At the pharmacy he checked his blood pressure and it read 134/68. Today his blood pressure was 138/82.

## 2022-12-19 NOTE — INTERPRETER SERVICES
[Patient Declined  Services] : - None: Patient declined  services [FreeTextEntry3] : Patient and MD share a common language (English)

## 2022-12-27 NOTE — PHYSICAL EXAM
[No Acute Distress] : no acute distress [Well Nourished] : well nourished [Well Developed] : well developed [Well-Appearing] : well-appearing [No Respiratory Distress] : no respiratory distress  [Clear to Auscultation] : lungs were clear to auscultation bilaterally [Normal Rate] : normal rate  [Regular Rhythm] : with a regular rhythm [Normal S1, S2] : normal S1 and S2 [de-identified] : 5/5 strength in all extremities, no point tenderness  [de-identified] : Patch of psoriasis along L middle finger greatly improved. R lateral hand with dry skin but no breaks or peeling [de-identified] : No numbness in UE or LE b/l

## 2022-12-27 NOTE — HISTORY OF PRESENT ILLNESS
[FreeTextEntry8] : Pt is a 48 y/o with PMHx psoriasis and elevated BP male presenting with a complaint of 2 episodes of sharp pains in his arms and legs that lasting a few seconds. The episodes were yesterday and and today morning. He reports this never happened to him before. He states he took tetracycline 500mg which he believes helped the pain. He thinks it could be because the change of weather. Denies numbness, weakness, or skin changes. \par \par \par \par \par \par \par

## 2022-12-27 NOTE — REVIEW OF SYSTEMS
[Fever] : no fever [Chest Pain] : no chest pain [Shortness Of Breath] : no shortness of breath [Abdominal Pain] : no abdominal pain [Nausea] : no nausea [Vomiting] : no vomiting [Headache] : no headache [Dizziness] : no dizziness

## 2023-01-17 ENCOUNTER — APPOINTMENT (OUTPATIENT)
Dept: FAMILY MEDICINE | Facility: HOSPITAL | Age: 48
End: 2023-01-17

## 2023-02-01 NOTE — HISTORY OF PRESENT ILLNESS
[FreeTextEntry8] : Patient is a 46 y/o Male presenting with a complaint of dry skin on his R hand. He states the dry skin has been there for about a year and he has not noticed progression. It is not causing him distress. He tried a coconut cream, and it helped. He would like to have a cream sent to the pharmacy. His skin does not peel or bleed and it is not itchy. He denies this patch of dry skin anywhere else on his body. His left hand has a few patches of dry skin as well. 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 He wears gloves at work. \par \par He also states he is very forgetful - he would forget where he put his glasses. Denies forgetting any serious information such as where he lives. Denies alcohol use (previously was a frequent drinker). Says his sleep is not great because he is always anxious thinking about work. \par

## 2023-02-01 NOTE — PHYSICAL EXAM
[No Acute Distress] : no acute distress [Well Nourished] : well nourished [Well Developed] : well developed [Well-Appearing] : well-appearing [Normal Sclera/Conjunctiva] : normal sclera/conjunctiva [Normal Outer Ear/Nose] : the outer ears and nose were normal in appearance [No Respiratory Distress] : no respiratory distress  [Clear to Auscultation] : lungs were clear to auscultation bilaterally [Normal Rate] : normal rate  [Regular Rhythm] : with a regular rhythm [Soft] : abdomen soft [Non Tender] : non-tender [Non-distended] : non-distended [de-identified] : lichenified patch on Right hypothenar eminence. Non tender. Dry skin on hands. Silver scaly plaque extending over dorsum of Left hand middle digit. No skin lesions on elbows

## 2023-02-01 NOTE — REVIEW OF SYSTEMS
[Fever] : no fever [Chills] : no chills [Chest Pain] : no chest pain [Palpitations] : no palpitations [Shortness Of Breath] : no shortness of breath [Cough] : no cough [Abdominal Pain] : no abdominal pain [Nausea] : no nausea [Vomiting] : no vomiting [Dysuria] : no dysuria

## 2023-02-08 ENCOUNTER — OUTPATIENT (OUTPATIENT)
Dept: OUTPATIENT SERVICES | Facility: HOSPITAL | Age: 48
LOS: 1 days | End: 2023-02-08
Payer: COMMERCIAL

## 2023-02-08 ENCOUNTER — APPOINTMENT (OUTPATIENT)
Dept: FAMILY MEDICINE | Facility: HOSPITAL | Age: 48
End: 2023-02-08

## 2023-02-08 VITALS
RESPIRATION RATE: 15 BRPM | SYSTOLIC BLOOD PRESSURE: 145 MMHG | TEMPERATURE: 98.1 F | OXYGEN SATURATION: 99 % | BODY MASS INDEX: 26.08 KG/M2 | DIASTOLIC BLOOD PRESSURE: 96 MMHG | WEIGHT: 138 LBS | HEART RATE: 86 BPM

## 2023-02-08 VITALS — SYSTOLIC BLOOD PRESSURE: 137 MMHG | DIASTOLIC BLOOD PRESSURE: 93 MMHG

## 2023-02-08 DIAGNOSIS — R03.0 ELEVATED BLOOD-PRESSURE READING, W/OUT DIAGNOSIS OF HYPERTENSION: ICD-10-CM

## 2023-02-08 DIAGNOSIS — Z00.00 ENCOUNTER FOR GENERAL ADULT MEDICAL EXAMINATION WITHOUT ABNORMAL FINDINGS: ICD-10-CM

## 2023-02-08 PROCEDURE — G0463: CPT

## 2023-02-08 RX ORDER — WHITE PETROLATUM 1.75 OZ
OINTMENT TOPICAL 3 TIMES DAILY
Qty: 1 | Refills: 0 | Status: DISCONTINUED | COMMUNITY
Start: 2022-11-30 | End: 2023-02-08

## 2023-02-08 RX ORDER — CLOBETASOL PROPIONATE 0.5 MG/G
0.05 CREAM TOPICAL TWICE DAILY
Qty: 1 | Refills: 0 | Status: DISCONTINUED | COMMUNITY
Start: 2022-11-30 | End: 2023-02-08

## 2023-02-08 NOTE — PHYSICAL EXAM
[No Acute Distress] : no acute distress [Well Nourished] : well nourished [Well Developed] : well developed [Well-Appearing] : well-appearing [No Respiratory Distress] : no respiratory distress  [No Accessory Muscle Use] : no accessory muscle use [Clear to Auscultation] : lungs were clear to auscultation bilaterally [Normal Rate] : normal rate  [Regular Rhythm] : with a regular rhythm [Normal S1, S2] : normal S1 and S2 [Soft] : abdomen soft [Non Tender] : non-tender
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
## 2023-02-08 NOTE — REVIEW OF SYSTEMS
----- Message from Scott Arzola sent at 8/5/2020  7:19 AM CDT -----  Contact: pt  Type: Needs Medical Advice  Who Called:  pt    Best Call Back Number: [phone]    Additional Information: pt has a life vest 4000 and wanting to return the equipment. They are not using this and need to know if dropping it off or mailing back in. Please call to advise       [Chest Pain] : no chest pain [Palpitations] : no palpitations [Shortness Of Breath] : no shortness of breath [Wheezing] : no wheezing [Cough] : no cough [Abdominal Pain] : no abdominal pain [Nausea] : no nausea [Vomiting] : no vomiting [Itching] : no itching [Skin Rash] : no skin rash

## 2023-02-08 NOTE — INTERPRETER SERVICES
[Patient Declined  Services] : - None: Patient declined  services [FreeTextEntry3] : Pt and provider shared common language (English)

## 2023-02-09 DIAGNOSIS — L40.9 PSORIASIS, UNSPECIFIED: ICD-10-CM

## 2023-02-09 DIAGNOSIS — I10 ESSENTIAL (PRIMARY) HYPERTENSION: ICD-10-CM

## 2023-05-18 ENCOUNTER — APPOINTMENT (OUTPATIENT)
Dept: FAMILY MEDICINE | Facility: HOSPITAL | Age: 48
End: 2023-05-18

## 2023-05-18 ENCOUNTER — OUTPATIENT (OUTPATIENT)
Dept: OUTPATIENT SERVICES | Facility: HOSPITAL | Age: 48
LOS: 1 days | End: 2023-05-18
Payer: COMMERCIAL

## 2023-05-18 VITALS
SYSTOLIC BLOOD PRESSURE: 150 MMHG | OXYGEN SATURATION: 100 % | RESPIRATION RATE: 15 BRPM | HEART RATE: 85 BPM | WEIGHT: 150 LBS | BODY MASS INDEX: 28.34 KG/M2 | TEMPERATURE: 97.9 F | DIASTOLIC BLOOD PRESSURE: 89 MMHG

## 2023-05-18 VITALS — SYSTOLIC BLOOD PRESSURE: 127 MMHG | DIASTOLIC BLOOD PRESSURE: 87 MMHG

## 2023-05-18 DIAGNOSIS — Z00.00 ENCOUNTER FOR GENERAL ADULT MEDICAL EXAMINATION WITHOUT ABNORMAL FINDINGS: ICD-10-CM

## 2023-05-18 PROCEDURE — G0463: CPT

## 2023-05-22 DIAGNOSIS — I10 ESSENTIAL (PRIMARY) HYPERTENSION: ICD-10-CM

## 2023-06-06 NOTE — HISTORY OF PRESENT ILLNESS
[de-identified] : 46 YO M presents for follow up of blood pressure. Patient states that he is not taking his medication, amlodipine due to the high cost and the fact that he feels fine. Patient denies any fever, chills, chest pain, shortness of breath, nausea, vomiting, headache, cough, leg pain dizziness or weakness. \par

## 2023-06-14 ENCOUNTER — RX RENEWAL (OUTPATIENT)
Age: 48
End: 2023-06-14

## 2023-06-23 ENCOUNTER — APPOINTMENT (OUTPATIENT)
Dept: FAMILY MEDICINE | Facility: HOSPITAL | Age: 48
End: 2023-06-23

## 2023-06-23 ENCOUNTER — OUTPATIENT (OUTPATIENT)
Dept: OUTPATIENT SERVICES | Facility: HOSPITAL | Age: 48
LOS: 1 days | End: 2023-06-23
Payer: COMMERCIAL

## 2023-06-23 VITALS
OXYGEN SATURATION: 98 % | HEART RATE: 81 BPM | DIASTOLIC BLOOD PRESSURE: 83 MMHG | SYSTOLIC BLOOD PRESSURE: 118 MMHG | RESPIRATION RATE: 16 BRPM | TEMPERATURE: 98 F

## 2023-06-23 DIAGNOSIS — Z87.898 PERSONAL HISTORY OF OTHER SPECIFIED CONDITIONS: ICD-10-CM

## 2023-06-23 DIAGNOSIS — I10 ESSENTIAL (PRIMARY) HYPERTENSION: ICD-10-CM

## 2023-06-23 DIAGNOSIS — Z00.00 ENCOUNTER FOR GENERAL ADULT MEDICAL EXAMINATION W/OUT ABNORMAL FINDINGS: ICD-10-CM

## 2023-06-23 DIAGNOSIS — Z78.9 OTHER SPECIFIED HEALTH STATUS: ICD-10-CM

## 2023-06-23 DIAGNOSIS — L40.9 PSORIASIS, UNSPECIFIED: ICD-10-CM

## 2023-06-23 DIAGNOSIS — Z00.00 ENCOUNTER FOR GENERAL ADULT MEDICAL EXAMINATION WITHOUT ABNORMAL FINDINGS: ICD-10-CM

## 2023-06-23 PROCEDURE — 80061 LIPID PANEL: CPT

## 2023-06-23 PROCEDURE — G0463: CPT

## 2023-06-23 PROCEDURE — 80053 COMPREHEN METABOLIC PANEL: CPT

## 2023-06-23 PROCEDURE — 85025 COMPLETE CBC W/AUTO DIFF WBC: CPT

## 2023-06-23 RX ORDER — AMLODIPINE BESYLATE 5 MG/1
5 TABLET ORAL
Qty: 1 | Refills: 3 | Status: ACTIVE | COMMUNITY
Start: 2022-11-30 | End: 1900-01-01

## 2023-06-23 NOTE — PHYSICAL EXAM
[Normal] : no acute distress, well nourished, well developed and well-appearing [Normal Sclera/Conjunctiva] : normal sclera/conjunctiva [PERRL] : pupils equal round and reactive to light [EOMI] : extraocular movements intact [No Respiratory Distress] : no respiratory distress  [No Accessory Muscle Use] : no accessory muscle use [Clear to Auscultation] : lungs were clear to auscultation bilaterally [Normal Rate] : normal rate  [Regular Rhythm] : with a regular rhythm [Normal S1, S2] : normal S1 and S2 [No Murmur] : no murmur heard [Soft] : abdomen soft [Non Tender] : non-tender [Non-distended] : non-distended [No Masses] : no abdominal mass palpated [No HSM] : no HSM [Normal Bowel Sounds] : normal bowel sounds [Normal Posterior Cervical Nodes] : no posterior cervical lymphadenopathy [Normal Anterior Cervical Nodes] : no anterior cervical lymphadenopathy [No Rash] : no rash

## 2023-06-25 LAB
ALBUMIN SERPL ELPH-MCNC: 4.6 G/DL
ALP BLD-CCNC: 82 U/L
ALT SERPL-CCNC: 24 U/L
ANION GAP SERPL CALC-SCNC: 11 MMOL/L
AST SERPL-CCNC: 24 U/L
BASOPHILS # BLD AUTO: 0.02 K/UL
BASOPHILS NFR BLD AUTO: 0.4 %
BILIRUB SERPL-MCNC: 0.3 MG/DL
BUN SERPL-MCNC: 9 MG/DL
CALCIUM SERPL-MCNC: 9.3 MG/DL
CHLORIDE SERPL-SCNC: 104 MMOL/L
CHOLEST SERPL-MCNC: 153 MG/DL
CO2 SERPL-SCNC: 24 MMOL/L
CREAT SERPL-MCNC: 0.96 MG/DL
EGFR: 99 ML/MIN/1.73M2
EOSINOPHIL # BLD AUTO: 0.09 K/UL
EOSINOPHIL NFR BLD AUTO: 1.9 %
GLUCOSE SERPL-MCNC: 95 MG/DL
HCT VFR BLD CALC: 40.2 %
HDLC SERPL-MCNC: 44 MG/DL
HGB BLD-MCNC: 13.3 G/DL
IMM GRANULOCYTES NFR BLD AUTO: 0.4 %
LDLC SERPL CALC-MCNC: 97 MG/DL
LYMPHOCYTES # BLD AUTO: 0.74 K/UL
LYMPHOCYTES NFR BLD AUTO: 15.6 %
MAN DIFF?: NORMAL
MCHC RBC-ENTMCNC: 28.4 PG
MCHC RBC-ENTMCNC: 33.1 GM/DL
MCV RBC AUTO: 85.9 FL
MONOCYTES # BLD AUTO: 0.33 K/UL
MONOCYTES NFR BLD AUTO: 7 %
NEUTROPHILS # BLD AUTO: 3.53 K/UL
NEUTROPHILS NFR BLD AUTO: 74.7 %
NONHDLC SERPL-MCNC: 109 MG/DL
PLATELET # BLD AUTO: 284 K/UL
POTASSIUM SERPL-SCNC: 4.3 MMOL/L
PROT SERPL-MCNC: 7 G/DL
RBC # BLD: 4.68 M/UL
RBC # FLD: 14.6 %
SODIUM SERPL-SCNC: 140 MMOL/L
TRIGL SERPL-MCNC: 63 MG/DL
WBC # FLD AUTO: 4.73 K/UL

## 2023-06-28 NOTE — INTERPRETER SERVICES
[[employer] ] : provided by [employer]   [Interpreters_IDNumber] : [id number] [Interpreters_FullName] : Sherman [TWNoteComboBox1] : Georgian

## 2023-06-28 NOTE — HISTORY OF PRESENT ILLNESS
[Hypertension] : Hypertension [FreeTextEntry6] : Here for blood pressure check. BP today 118/83, controlled. He did not take his medication this morning, but he has been taking it regular.  [Does not check BP] : The patient is not checking blood pressure [120/80] : Target blood pressure is 120/80 [Target goal met] : BP target goal met [FreeTextEntry1] : BP check, CPE [de-identified] : Patient is a 47 year old male with pmhx HTN here for blood pressure check and CPE. No other complaints today. He did not take his medication this morning, but he reports he has been taking it regularly daily.

## 2023-06-28 NOTE — INTERPRETER SERVICES
[[employer] ] : provided by [employer]   [Interpreters_IDNumber] : [id number] [Interpreters_FullName] : Sherman [TWNoteComboBox1] : Cameroonian

## 2023-06-28 NOTE — HEALTH RISK ASSESSMENT
[Good] : ~his/her~  mood as  good [No] : No [0] : 2) Feeling down, depressed, or hopeless: Not at all (0) [Alone] : lives alone [# of Members in Household ___] :  household currently consist of [unfilled] member(s) [Employed] : employed [High School] : high school [Single] : single [# Of Children ___] : has [unfilled] children [Feels Safe at Home] : Feels safe at home [Fully functional (bathing, dressing, toileting, transferring, walking, feeding)] : Fully functional (bathing, dressing, toileting, transferring, walking, feeding) [Fully functional (using the telephone, shopping, preparing meals, housekeeping, doing laundry, using] : Fully functional and needs no help or supervision to perform IADLs (using the telephone, shopping, preparing meals, housekeeping, doing laundry, using transportation, managing medications and managing finances) [Former] : Former [15-19] : 15-19 [PAM5Vricy] : 0 [Sexually Active] : not sexually active [FreeTextEntry2] : Works with [employer]   [de-identified] : 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 Questionable historian

## 2023-06-28 NOTE — HISTORY OF PRESENT ILLNESS
[Hypertension] : Hypertension [FreeTextEntry6] : Here for blood pressure check. BP today 118/83, controlled. He did not take his medication this morning, but he has been taking it regular.  [Does not check BP] : The patient is not checking blood pressure [120/80] : Target blood pressure is 120/80 [Target goal met] : BP target goal met [FreeTextEntry1] : BP check, CPE [de-identified] : Patient is a 47 year old male with pmhx HTN here for blood pressure check and CPE. No other complaints today. He did not take his medication this morning, but he reports he has been taking it regularly daily.

## 2023-06-28 NOTE — HEALTH RISK ASSESSMENT
[Good] : ~his/her~  mood as  good [No] : No [0] : 2) Feeling down, depressed, or hopeless: Not at all (0) [Alone] : lives alone [# of Members in Household ___] :  household currently consist of [unfilled] member(s) [Employed] : employed [High School] : high school [Single] : single [# Of Children ___] : has [unfilled] children [Feels Safe at Home] : Feels safe at home [Fully functional (bathing, dressing, toileting, transferring, walking, feeding)] : Fully functional (bathing, dressing, toileting, transferring, walking, feeding) [Fully functional (using the telephone, shopping, preparing meals, housekeeping, doing laundry, using] : Fully functional and needs no help or supervision to perform IADLs (using the telephone, shopping, preparing meals, housekeeping, doing laundry, using transportation, managing medications and managing finances) [Former] : Former [15-19] : 15-19 [AIX2Vlacs] : 0 [Sexually Active] : not sexually active [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 Questionable historian

## 2023-07-11 ENCOUNTER — RESULT CHARGE (OUTPATIENT)
Age: 48
End: 2023-07-11

## 2023-07-12 ENCOUNTER — APPOINTMENT (OUTPATIENT)
Dept: FAMILY MEDICINE | Facility: HOSPITAL | Age: 48
End: 2023-07-12

## 2023-07-12 ENCOUNTER — OUTPATIENT (OUTPATIENT)
Dept: OUTPATIENT SERVICES | Facility: HOSPITAL | Age: 48
LOS: 1 days | End: 2023-07-12
Payer: COMMERCIAL

## 2023-07-12 VITALS
OXYGEN SATURATION: 99 % | SYSTOLIC BLOOD PRESSURE: 142 MMHG | BODY MASS INDEX: 27.96 KG/M2 | DIASTOLIC BLOOD PRESSURE: 84 MMHG | RESPIRATION RATE: 16 BRPM | HEART RATE: 75 BPM | TEMPERATURE: 98.5 F | WEIGHT: 148 LBS

## 2023-07-12 DIAGNOSIS — R07.9 CHEST PAIN, UNSPECIFIED: ICD-10-CM

## 2023-07-12 DIAGNOSIS — Z00.00 ENCOUNTER FOR GENERAL ADULT MEDICAL EXAMINATION WITHOUT ABNORMAL FINDINGS: ICD-10-CM

## 2023-07-12 PROCEDURE — G0463: CPT

## 2023-07-12 PROCEDURE — 93005 ELECTROCARDIOGRAM TRACING: CPT

## 2023-07-12 PROCEDURE — 93010 ELECTROCARDIOGRAM REPORT: CPT

## 2023-07-13 DIAGNOSIS — R07.9 CHEST PAIN, UNSPECIFIED: ICD-10-CM

## 2023-07-14 NOTE — PHYSICAL EXAM
[No Acute Distress] : no acute distress [Well-Appearing] : well-appearing [Normal Voice/Communication] : normal voice/communication [Normal Sclera/Conjunctiva] : normal sclera/conjunctiva [Normal Outer Ear/Nose] : the outer ears and nose were normal in appearance [Supple] : supple [No Respiratory Distress] : no respiratory distress  [No Accessory Muscle Use] : no accessory muscle use [Clear to Auscultation] : lungs were clear to auscultation bilaterally [Normal Rate] : normal rate  [Regular Rhythm] : with a regular rhythm [Normal S1, S2] : normal S1 and S2 [Soft] : abdomen soft [Non-distended] : non-distended [de-identified] : Very slight TTP in the epigastrium

## 2023-07-14 NOTE — HISTORY OF PRESENT ILLNESS
[FreeTextEntry8] : Pt is a 49 yo M, PMH of overweight, htn, psoriasis, presenting for past episode of cp. Of note, pt had two self-limiting episode of self reported "chest pain" several days ago, lasting 1 sec each, not related to activity, denies dizziness, SOB, palpitations, radiating pain, however on futher questioning, notes that the pain is epigastric, possibly related to gas, no n/v, changes in bm. No other urgent sxs or complaints endorsed at this time.\par

## 2023-07-14 NOTE — REVIEW OF SYSTEMS
[Chest Pain] : chest pain [Negative] : Genitourinary [Palpitations] : no palpitations [Orthopena] : no orthopnea

## 2024-06-20 ENCOUNTER — APPOINTMENT (OUTPATIENT)
Dept: FAMILY MEDICINE | Facility: HOSPITAL | Age: 49
End: 2024-06-20

## 2024-06-20 ENCOUNTER — OUTPATIENT (OUTPATIENT)
Dept: OUTPATIENT SERVICES | Facility: HOSPITAL | Age: 49
LOS: 1 days | End: 2024-06-20
Payer: COMMERCIAL

## 2024-06-20 DIAGNOSIS — M62.81 MUSCLE WEAKNESS (GENERALIZED): ICD-10-CM

## 2024-06-20 DIAGNOSIS — Z00.00 ENCOUNTER FOR GENERAL ADULT MEDICAL EXAMINATION WITHOUT ABNORMAL FINDINGS: ICD-10-CM

## 2024-06-20 PROCEDURE — 82746 ASSAY OF FOLIC ACID SERUM: CPT

## 2024-06-20 PROCEDURE — 80053 COMPREHEN METABOLIC PANEL: CPT

## 2024-06-20 PROCEDURE — 80061 LIPID PANEL: CPT

## 2024-06-20 PROCEDURE — 83036 HEMOGLOBIN GLYCOSYLATED A1C: CPT

## 2024-06-20 PROCEDURE — 82607 VITAMIN B-12: CPT

## 2024-06-20 PROCEDURE — G0463: CPT

## 2024-06-20 PROCEDURE — 85025 COMPLETE CBC W/AUTO DIFF WBC: CPT

## 2024-06-22 LAB
ALBUMIN SERPL ELPH-MCNC: 4.7 G/DL
ALP BLD-CCNC: 64 U/L
ALT SERPL-CCNC: 22 U/L
ANION GAP SERPL CALC-SCNC: 13 MMOL/L
AST SERPL-CCNC: 21 U/L
BASOPHILS # BLD AUTO: 0.04 K/UL
BASOPHILS NFR BLD AUTO: 0.9 %
BILIRUB SERPL-MCNC: 0.6 MG/DL
BUN SERPL-MCNC: 14 MG/DL
CALCIUM SERPL-MCNC: 9.5 MG/DL
CHLORIDE SERPL-SCNC: 104 MMOL/L
CHOLEST SERPL-MCNC: 155 MG/DL
CO2 SERPL-SCNC: 25 MMOL/L
CREAT SERPL-MCNC: 1.13 MG/DL
EGFR: 81 ML/MIN/1.73M2
EOSINOPHIL # BLD AUTO: 0.12 K/UL
EOSINOPHIL NFR BLD AUTO: 2.8 %
ESTIMATED AVERAGE GLUCOSE: 105 MG/DL
FOLATE SERPL-MCNC: >20 NG/ML
GLUCOSE SERPL-MCNC: 97 MG/DL
HBA1C MFR BLD HPLC: 5.3 %
HCT VFR BLD CALC: 42.5 %
HDLC SERPL-MCNC: 44 MG/DL
HGB BLD-MCNC: 14.1 G/DL
IMM GRANULOCYTES NFR BLD AUTO: 0.5 %
LDLC SERPL CALC-MCNC: 98 MG/DL
LYMPHOCYTES # BLD AUTO: 0.97 K/UL
LYMPHOCYTES NFR BLD AUTO: 22.5 %
MAN DIFF?: NORMAL
MCHC RBC-ENTMCNC: 28.5 PG
MCHC RBC-ENTMCNC: 33.2 GM/DL
MCV RBC AUTO: 85.9 FL
MONOCYTES # BLD AUTO: 0.39 K/UL
MONOCYTES NFR BLD AUTO: 9 %
NEUTROPHILS # BLD AUTO: 2.78 K/UL
NEUTROPHILS NFR BLD AUTO: 64.3 %
NONHDLC SERPL-MCNC: 111 MG/DL
PLATELET # BLD AUTO: 272 K/UL
POTASSIUM SERPL-SCNC: 4.3 MMOL/L
PROT SERPL-MCNC: 7.1 G/DL
RBC # BLD: 4.95 M/UL
RBC # FLD: 13.8 %
SODIUM SERPL-SCNC: 142 MMOL/L
TRIGL SERPL-MCNC: 66 MG/DL
VIT B12 SERPL-MCNC: 290 PG/ML
WBC # FLD AUTO: 4.32 K/UL

## 2024-06-24 NOTE — HISTORY OF PRESENT ILLNESS
[FreeTextEntry1] : left LE weakness [de-identified] : 49M here for LLE weakness for 20+ years. pt reports he feels weakness when working and carrying something heavy. denies slurred speech.

## 2024-06-24 NOTE — PHYSICAL EXAM
[Normal] : no joint swelling and grossly normal strength and tone [de-identified] : 5/5 muscle strength. 2+ reflexes. gait normal

## 2024-06-24 NOTE — ASSESSMENT
[FreeTextEntry1] : 49M here for  #muscle weakness -muscle strength 5/5 b/l -no red flag sxs -reassurance provided   RTC 1 month for cpe  case discussed with dr hsu

## 2024-07-27 ENCOUNTER — APPOINTMENT (OUTPATIENT)
Dept: FAMILY MEDICINE | Facility: HOSPITAL | Age: 49
End: 2024-07-27

## 2024-07-29 ENCOUNTER — EMERGENCY (EMERGENCY)
Facility: HOSPITAL | Age: 49
LOS: 1 days | Discharge: HOME | End: 2024-07-29
Attending: STUDENT IN AN ORGANIZED HEALTH CARE EDUCATION/TRAINING PROGRAM | Admitting: EMERGENCY MEDICINE
Payer: SELF-PAY

## 2024-07-29 VITALS
RESPIRATION RATE: 16 BRPM | WEIGHT: 148.81 LBS | SYSTOLIC BLOOD PRESSURE: 167 MMHG | OXYGEN SATURATION: 97 % | HEART RATE: 90 BPM | DIASTOLIC BLOOD PRESSURE: 104 MMHG | TEMPERATURE: 99 F | HEIGHT: 60 IN

## 2024-07-29 PROCEDURE — 99282 EMERGENCY DEPT VISIT SF MDM: CPT

## 2024-07-29 PROCEDURE — 99283 EMERGENCY DEPT VISIT LOW MDM: CPT

## 2024-07-29 NOTE — ED PROVIDER NOTE - NSFOLLOWUPINSTRUCTIONS_ED_ALL_ED_FT
Abrasión     Lucas abrasión es un adeline o raspado en la superficie exterior de la piel. Lucas abrasión no atraviesa todas las capas de la piel. Es importante cuidar adecuadamente la abrasión para prevenir infecciones.    ¿Cuales son las causas?  Esta condición es causada por caer o deslizarse por el suelo u otra superficie. Cuando farias piel se frota contra algo, las capas externa e interna de la piel pueden desprenderse.    ¿Cuáles son los signos o síntomas?  El síntoma principal de esta afección es un adeline o un rasguño. El raspado puede estar sangrando o puede verse hammonds o bailey. Si la abrasión fue causada por lucas caída, es posible que haya un hematoma debajo del adeline o raspado.    ¿Cómo se trata esto?  El tratamiento para esta afección depende de qué wilcox leonela y profunda sea la abrasión. En la mayoría de los casos:  Farias abrasión se limpiará con agua y jabón suave. Lovejoy se hace para eliminar la suciedad.    Sigue estas instrucciones en casa:  Medicamentos     [place] o aplique medicamentos recetados y de venta kota únicamente según las indicaciones de farias proveedor de atención médica.  Si le recetaron un antibiótico, aplíquelo según las indicaciones de farias proveedor de atención médica.  Cuidado de heridas    ~~~  Limpie la herida 2 o 3 veces al día, o según las indicaciones de farias proveedor de atención médica. Para hacer esto, lave la herida con agua y jabón suave, enjuague el jabón y seque la herida con palmaditas con lucas toalla limpia. No frotes la herida.  Mantenga el vendaje limpio y seco según las indicaciones de farias proveedor de atención médica.  Hay muchas formas diferentes de cerrar y cubrir lucas herida. Siga las instrucciones de farias proveedor de atención médica sobre:  Cuidando tu herida.  Cambiarse y quitarse el vendaje. Es posible que tenga que cambiarse el vendaje lucas o más veces al día, o según las indicaciones de farias proveedor de atención médica.  Revise farias herida todos los días para detectar signos de infección. Comprobar:  Enrojecimiento, particularmente lucas erik davion que se extiende desde la herida.  Hinchazón o aumento del dolor.  Calor.  Líquido, pus o mal olor.  Si se le indica, coloque hielo en el área lesionada para reducir el dolor y la hinchazón:  Salvador hielo en lucas bolsa de plástico.   Coloque lucas toalla entre farias piel y la bolsa.   Deje el hielo shirley 20 minutos, 2 o 3 veces al día.  Instrucciones generales     No se bañe, nade ni use un jacuzzi hasta que farias proveedor de atención médica le indique que puede hacerlo.  Si es posible, levante el área lesionada por encima del nivel de farias corazón mientras esté sentado o acostado. [place] reducirá el dolor y la hinchazón.  Realice todas las visitas de seguimiento según las indicaciones de farias proveedor de atención médica. [place] es importante.  Comuníquese con un proveedor de atención médica si:  Recibió lucas vacuna contra el tétanos y tiene hinchazón, dolor intenso, enrojecimiento o sangrado en el lugar de la inyección.  Farias dolor no se controla con medicamentos.  Tiene enrojecimiento, hinchazón o más dolor en el lugar de la herida.  Obtenga ayuda de inmediato si:  Tienes lucas erik davion que se extiende desde tu herida.  Tienes fiebre.  Le sale líquido, stefania o pus de la herida.  Notas un mal olor proveniente de tu herida o de tu vendaje.  Resumen  Lucas abrasión es un adeline o raspado en la superficie exterior de la piel. Lucas abrasión no atraviesa todas las capas de la piel.  Cuide farias abrasión adecuadamente para prevenir infecciones.  Limpie la herida con agua y jabón suave 2 a 3 veces al día. Siga las instrucciones de farias proveedor de atención médica sobre cómo caryn medicamentos y cambiar farias vendaje.  Comuníquese con farias proveedor de atención médica si tiene enrojecimiento, hinchazón o más dolor en el área de la herida.  Busque ayuda de inmediato si tiene fiebre o si sale líquido, stefania, pus, mal olor o lucas erik davion saliendo de la herida.  Esta información no pretende reemplazar los consejos que le brinde farias proveedor de atención médica. Asegúrese de discutir cualquier pregunta que tenga con farias proveedor de atención médica.

## 2024-07-29 NOTE — ED PROVIDER NOTE - PHYSICAL EXAMINATION
CONSTITUTIONAL: NAD  SKIN: Warm dry  HEAD: +abrasion to intraoral upper lip, no active bleeding, dentition stable  EYES: NL inspection  ENT: MMM  NEURO: Grossly unremarkable  PSYCH: Cooperative, appropriate.

## 2024-07-29 NOTE — ED PROVIDER NOTE - CARE PROVIDERS DIRECT ADDRESSES
[email or web address]

## 2024-07-29 NOTE — ED PROVIDER NOTE - PATIENT PORTAL LINK FT
You can access the FollowMyHealth Patient Portal offered by [facility] by registering at the following website: [email or web address]. By joining [employer]’s FollowMyHealth portal, you will also be able to view your health information using other applications (apps) compatible with our system.

## 2024-07-29 NOTE — ED PROVIDER NOTE - CLINICAL SUMMARY MEDICAL DECISION MAKING FREE TEXT BOX
49-year-old male no significant past medical, tetanus vaccination up-to-date within the last 6 months presents with injury to lip.  Patient was at work when a metal object accidentally hit his upper lip when he pulled it out. Denies fall, LOC, weakness, dental injury. Injury occurred about 2 hours pta.  PE: intraoral upper lip abrasion, no active bleeding. No need for stitches. Given ice pack and will dc with strict return precautions

## 2024-07-29 NOTE — ED PROVIDER NOTE - OBJECTIVE STATEMENT
49-year-old male no significant past medical, tetanus vaccination up-to-date within the last 6 months presents with injury to lip.  Patient was at work when a metal object accidentally hit his upper lip when he pulled it out. Denies fall, LOC, weakness, dental injury. Injury occurred about 2 hours pta.

## 2024-07-29 NOTE — ED PROVIDER NOTE - CARE PROVIDER_API CALL
Madelaine Calderon  Family Medicine [address] [place] [address]  Phone: [phone]  Fax: [phone]  Follow Up Time: 4-6 Days

## 2024-10-22 ENCOUNTER — APPOINTMENT (OUTPATIENT)
Dept: FAMILY MEDICINE | Facility: HOSPITAL | Age: 49
End: 2024-10-22

## 2024-10-22 ENCOUNTER — MED ADMIN CHARGE (OUTPATIENT)
Age: 49
End: 2024-10-22

## 2024-10-22 ENCOUNTER — OUTPATIENT (OUTPATIENT)
Dept: OUTPATIENT SERVICES | Facility: HOSPITAL | Age: 49
LOS: 1 days | End: 2024-10-22
Payer: COMMERCIAL

## 2024-10-22 VITALS
RESPIRATION RATE: 16 BRPM | TEMPERATURE: 98.1 F | SYSTOLIC BLOOD PRESSURE: 142 MMHG | BODY MASS INDEX: 28.34 KG/M2 | HEART RATE: 98 BPM | OXYGEN SATURATION: 100 % | DIASTOLIC BLOOD PRESSURE: 99 MMHG | WEIGHT: 150 LBS

## 2024-10-22 VITALS — DIASTOLIC BLOOD PRESSURE: 90 MMHG | SYSTOLIC BLOOD PRESSURE: 140 MMHG

## 2024-10-22 DIAGNOSIS — Z00.00 ENCOUNTER FOR GENERAL ADULT MEDICAL EXAMINATION WITHOUT ABNORMAL FINDINGS: ICD-10-CM

## 2024-10-22 DIAGNOSIS — I10 ESSENTIAL (PRIMARY) HYPERTENSION: ICD-10-CM

## 2024-10-22 DIAGNOSIS — Z23 ENCOUNTER FOR IMMUNIZATION: ICD-10-CM

## 2024-10-22 PROCEDURE — 90471 IMMUNIZATION ADMIN: CPT

## 2024-10-22 PROCEDURE — G0463: CPT

## 2024-10-22 PROCEDURE — 90656 IIV3 VACC NO PRSV 0.5 ML IM: CPT

## 2024-11-12 ENCOUNTER — OUTPATIENT (OUTPATIENT)
Dept: OUTPATIENT SERVICES | Facility: HOSPITAL | Age: 49
LOS: 1 days | End: 2024-11-12
Payer: COMMERCIAL

## 2024-11-12 ENCOUNTER — APPOINTMENT (OUTPATIENT)
Dept: FAMILY MEDICINE | Facility: HOSPITAL | Age: 49
End: 2024-11-12

## 2024-11-12 VITALS
TEMPERATURE: 97.9 F | BODY MASS INDEX: 29.83 KG/M2 | SYSTOLIC BLOOD PRESSURE: 138 MMHG | WEIGHT: 158 LBS | OXYGEN SATURATION: 99 % | RESPIRATION RATE: 16 BRPM | HEART RATE: 84 BPM | HEIGHT: 61 IN | DIASTOLIC BLOOD PRESSURE: 85 MMHG

## 2024-11-12 VITALS — DIASTOLIC BLOOD PRESSURE: 98 MMHG | SYSTOLIC BLOOD PRESSURE: 144 MMHG

## 2024-11-12 DIAGNOSIS — Z00.00 ENCOUNTER FOR GENERAL ADULT MEDICAL EXAMINATION WITHOUT ABNORMAL FINDINGS: ICD-10-CM

## 2024-11-12 DIAGNOSIS — Z00.00 ENCOUNTER FOR GENERAL ADULT MEDICAL EXAMINATION W/OUT ABNORMAL FINDINGS: ICD-10-CM

## 2024-11-12 DIAGNOSIS — I10 ESSENTIAL (PRIMARY) HYPERTENSION: ICD-10-CM

## 2024-11-12 DIAGNOSIS — M79.673 PAIN IN UNSPECIFIED FOOT: ICD-10-CM

## 2024-11-12 PROCEDURE — G0463: CPT

## 2024-12-24 PROBLEM — F10.90 ALCOHOL USE: Status: RESOLVED | Noted: 2022-09-07 | Resolved: 2022-12-07

## 2025-08-18 ENCOUNTER — OUTPATIENT (OUTPATIENT)
Dept: OUTPATIENT SERVICES | Facility: HOSPITAL | Age: 50
LOS: 1 days | End: 2025-08-18
Payer: SELF-PAY

## 2025-08-18 ENCOUNTER — APPOINTMENT (OUTPATIENT)
Age: 50
End: 2025-08-18

## 2025-08-18 VITALS
WEIGHT: 160 LBS | TEMPERATURE: 98.1 F | SYSTOLIC BLOOD PRESSURE: 149 MMHG | RESPIRATION RATE: 14 BRPM | OXYGEN SATURATION: 98 % | DIASTOLIC BLOOD PRESSURE: 68 MMHG | BODY MASS INDEX: 30.23 KG/M2

## 2025-08-18 VITALS
WEIGHT: 160 LBS | DIASTOLIC BLOOD PRESSURE: 102 MMHG | RESPIRATION RATE: 15 BRPM | SYSTOLIC BLOOD PRESSURE: 143 MMHG | TEMPERATURE: 98 F | HEART RATE: 85 BPM | BODY MASS INDEX: 30.23 KG/M2 | OXYGEN SATURATION: 99 %

## 2025-08-18 VITALS — SYSTOLIC BLOOD PRESSURE: 144 MMHG | DIASTOLIC BLOOD PRESSURE: 97 MMHG

## 2025-08-18 DIAGNOSIS — Z00.00 ENCOUNTER FOR GENERAL ADULT MEDICAL EXAMINATION WITHOUT ABNORMAL FINDINGS: ICD-10-CM

## 2025-08-18 DIAGNOSIS — M79.673 PAIN IN UNSPECIFIED FOOT: ICD-10-CM

## 2025-08-18 DIAGNOSIS — I10 ESSENTIAL (PRIMARY) HYPERTENSION: ICD-10-CM

## 2025-08-18 DIAGNOSIS — Z91.199 PATIENT'S NONCOMPLIANCE WITH OTHER MEDICAL TREATMENT AND REGIMEN DUE TO UNSPECIFIED REASON: ICD-10-CM

## 2025-08-18 DIAGNOSIS — Z55.0 ILLITERACY AND LOW-LEVEL LITERACY: ICD-10-CM

## 2025-08-18 DIAGNOSIS — Z87.39 PERSONAL HISTORY OF OTHER DISEASES OF THE MUSCULOSKELETAL SYSTEM AND CONNECTIVE TISSUE: ICD-10-CM

## 2025-08-18 PROCEDURE — G0463: CPT

## 2025-08-18 SDOH — EDUCATIONAL SECURITY - EDUCATION ATTAINMENT: ILITERACY AND LOW LEVEL LITERACY: Z55.0

## 2025-09-03 ENCOUNTER — APPOINTMENT (OUTPATIENT)
Age: 50
End: 2025-09-03